# Patient Record
Sex: FEMALE | Race: WHITE | NOT HISPANIC OR LATINO | URBAN - METROPOLITAN AREA
[De-identification: names, ages, dates, MRNs, and addresses within clinical notes are randomized per-mention and may not be internally consistent; named-entity substitution may affect disease eponyms.]

---

## 2023-08-30 LAB — URINE CULTURE: NORMAL

## 2023-10-05 PROBLEM — L03.039 PARONYCHIA OF TOE: Status: ACTIVE | Noted: 2023-10-05

## 2023-10-05 PROBLEM — N94.6 DYSMENORRHEA: Status: ACTIVE | Noted: 2020-02-20

## 2023-10-05 RX ORDER — MUPIROCIN 20 MG/G
OINTMENT TOPICAL
COMMUNITY
Start: 2020-08-21 | End: 2023-10-06

## 2023-10-05 RX ORDER — SULFAMETHOXAZOLE AND TRIMETHOPRIM 800; 160 MG/1; MG/1
2 TABLET ORAL 2 TIMES DAILY
COMMUNITY
Start: 2020-08-21 | End: 2023-10-06

## 2023-10-05 RX ORDER — CHLORHEXIDINE GLUCONATE ORAL RINSE 1.2 MG/ML
SOLUTION DENTAL
COMMUNITY
End: 2023-10-06

## 2023-10-06 ENCOUNTER — OFFICE VISIT (OUTPATIENT)
Dept: OBSTETRICS AND GYNECOLOGY | Facility: CLINIC | Age: 23
End: 2023-10-06
Payer: COMMERCIAL

## 2023-10-06 VITALS
WEIGHT: 155.8 LBS | SYSTOLIC BLOOD PRESSURE: 132 MMHG | HEIGHT: 62 IN | BODY MASS INDEX: 28.67 KG/M2 | DIASTOLIC BLOOD PRESSURE: 76 MMHG

## 2023-10-06 DIAGNOSIS — Z12.4 CERVICAL CANCER SCREENING: ICD-10-CM

## 2023-10-06 DIAGNOSIS — Z30.41 ENCOUNTER FOR SURVEILLANCE OF CONTRACEPTIVE PILLS: ICD-10-CM

## 2023-10-06 DIAGNOSIS — Z01.419 WELL WOMAN EXAM WITH ROUTINE GYNECOLOGICAL EXAM: Primary | ICD-10-CM

## 2023-10-06 PROCEDURE — 88141 CYTOPATH C/V INTERPRET: CPT | Performed by: PATHOLOGY

## 2023-10-06 PROCEDURE — 87661 TRICHOMONAS VAGINALIS AMPLIF: CPT

## 2023-10-06 PROCEDURE — 99385 PREV VISIT NEW AGE 18-39: CPT

## 2023-10-06 PROCEDURE — 88175 CYTOPATH C/V AUTO FLUID REDO: CPT

## 2023-10-06 PROCEDURE — 87800 DETECT AGNT MULT DNA DIREC: CPT

## 2023-10-06 RX ORDER — NORGESTIMATE AND ETHINYL ESTRADIOL 7DAYSX3 LO
1 KIT ORAL DAILY
COMMUNITY
End: 2023-10-06 | Stop reason: SDUPTHER

## 2023-10-06 RX ORDER — NORGESTIMATE AND ETHINYL ESTRADIOL 7DAYSX3 LO
1 KIT ORAL DAILY
Qty: 28 TABLET | Refills: 11 | Status: SHIPPED | OUTPATIENT
Start: 2023-10-06 | End: 2023-10-20 | Stop reason: SDUPTHER

## 2023-10-06 ASSESSMENT — ENCOUNTER SYMPTOMS
DYSURIA: 0
RESPIRATORY NEGATIVE: 1
NEUROLOGICAL NEGATIVE: 1
PSYCHIATRIC NEGATIVE: 1
ALLERGIC/IMMUNOLOGIC NEGATIVE: 1
EYES NEGATIVE: 1
MUSCULOSKELETAL NEGATIVE: 1
ENDOCRINE NEGATIVE: 1
CARDIOVASCULAR NEGATIVE: 1
DIFFICULTY URINATING: 0
GASTROINTESTINAL NEGATIVE: 1
FREQUENCY: 0
HEMATOLOGIC/LYMPHATIC NEGATIVE: 1
CONSTITUTIONAL NEGATIVE: 1

## 2023-10-06 NOTE — PROGRESS NOTES
Assessment/Plan     22 y.o. presents for well woman exam.     Cervical Cancer Screening  - PAP today  - Reviewed ASCCP guidelines with pt; next PAP in 3 yrs if nml    STD Screening  - pt accepts GC/CT/Trich to be added to PAP    Contraceptive Plan  - Tri-Lo Indy OCP, pt happy with this method of contraception  - The risks of clotting with birth control containing estrogen was discussed with the patient. She denies a personal history of smoking, migraine with aura, blood clotting and hypertension. She denies having any relatives with a history of DVT or PE, and any relatives less than 50 years old with a history of MI or CVA.   - Patient aware of risks and consents to continuing this method. Refill Rx sent to pt preferred pharmacy.    Health Maintenance  - SBE reviewed and encouraged monthly  - diet and exercise reviewed; recommended 150min exercise per week or 30min/day x5 days  - Routine follow up with PCP for health maintenance examination encouraged    F/U in 1 year or as needed.    ZACHRAIAH Nieto-DEMETRIA Matson     Shelbie Harrell is a 22 y.o. female presenting for a well woman exam. No concerns today.     PMH, PSH, allergies, and current medications reviewed - see chart.  Family Hx reviewed. Pt reports no family hx of gynecologic or breast cancer in first degree family members.     OB Hx:      Social Hx:  - relationship: dating  - sexually active: yes  - employment:  at [employer]  - diet:  general  - exercise: yes  - denies tobacco or illicit drug use. reports occasional alcohol use.    Sexual Concerns: denies   PAP Hx: Last PAP N/A  Last mammo: N/A  STI Hx: denies  Contraception: Tri-Lo-Indy OCP  Menstrual Periods: Patient's last menstrual period was 2023 (exact date). Periods are regular every 28-30 days, lasting 4 days.  HPV Vaccination Status: vaccinated    Review of Systems   Constitutional: Negative.    HENT: Negative.     Eyes: Negative.    Respiratory: Negative.    "  Cardiovascular: Negative.    Gastrointestinal: Negative.    Endocrine: Negative.    Genitourinary: Negative.  Negative for difficulty urinating, dyspareunia, dysuria, frequency, genital sores, menstrual problem, pelvic pain, urgency, vaginal bleeding, vaginal discharge and vaginal pain.   Musculoskeletal: Negative.    Skin: Negative.    Allergic/Immunologic: Negative.    Neurological: Negative.    Hematological: Negative.    Psychiatric/Behavioral: Negative.         Objective     /76   Ht 1.575 m (5' 2\")   Wt 70.7 kg (155 lb 12.8 oz)   LMP 09/28/2023 (Exact Date)   BMI 28.50 kg/m²     Physical Exam  Vitals reviewed. Chaperone present: offered breast exam, pt declined.   Constitutional:       General: She is not in acute distress.     Appearance: Normal appearance.   HENT:      Head: Normocephalic and atraumatic.      Mouth/Throat:      Palate: No mass.   Neck:      Thyroid: No thyroid mass, thyromegaly or thyroid tenderness.   Cardiovascular:      Rate and Rhythm: Normal rate and regular rhythm.      Heart sounds: Normal heart sounds, S1 normal and S2 normal.   Pulmonary:      Effort: Pulmonary effort is normal.      Breath sounds: Normal breath sounds.   Abdominal:      General: Abdomen is flat.      Palpations: Abdomen is soft.      Tenderness: There is no abdominal tenderness.   Genitourinary:     General: Normal vulva.      Exam position: Lithotomy position.      Pubic Area: No rash.       Labia:         Right: No rash or lesion.         Left: No rash or lesion.       Urethra: No prolapse, urethral swelling or urethral lesion.      Vagina: Normal.      Cervix: No cervical motion tenderness, discharge or lesion.      Uterus: Not enlarged and not tender.       Adnexa:         Right: No mass or tenderness.          Left: No mass or tenderness.     Musculoskeletal:         General: Normal range of motion.      Cervical back: Normal range of motion and neck supple.   Skin:     General: Skin is warm and " dry.   Neurological:      Mental Status: She is alert and oriented to person, place, and time.   Psychiatric:         Mood and Affect: Mood normal.         Behavior: Behavior normal.         Thought Content: Thought content normal.         Judgment: Judgment normal.

## 2023-10-10 LAB
C TRACH RRNA SPEC QL NAA+PROBE: NEGATIVE
N GONORRHOEA DNA SPEC QL PROBE+SIG AMP: NEGATIVE
T VAGINALIS RRNA SPEC QL NAA+PROBE: NEGATIVE

## 2023-10-20 ENCOUNTER — TELEPHONE (OUTPATIENT)
Dept: OBSTETRICS AND GYNECOLOGY | Facility: CLINIC | Age: 23
End: 2023-10-20
Payer: COMMERCIAL

## 2023-10-20 DIAGNOSIS — Z30.41 ENCOUNTER FOR SURVEILLANCE OF CONTRACEPTIVE PILLS: ICD-10-CM

## 2023-10-20 RX ORDER — NORGESTIMATE AND ETHINYL ESTRADIOL 7DAYSX3 LO
1 KIT ORAL DAILY
Qty: 84 TABLET | Refills: 3 | Status: SHIPPED | OUTPATIENT
Start: 2023-10-20

## 2023-10-20 NOTE — TELEPHONE ENCOUNTER
Pt. Is asking for a 3 month supply of b/c instead of a one month supply. She is taking Tri-lo-noelle. Pharmacy is [facility] on [address].

## 2023-10-25 LAB
CYTOLOGY CMNT CVX/VAG CYTO-IMP: NORMAL
LAB AP HPV GENOTYPE QUESTION: YES
LAB AP HPV HR: NORMAL
LAB AP PAP ADDITIONAL TESTS: NORMAL
LABORATORY COMMENT REPORT: NORMAL
LMP START DATE: NORMAL
PATH REPORT.TOTAL CANCER: NORMAL

## 2024-01-26 ENCOUNTER — OFFICE VISIT (OUTPATIENT)
Dept: URGENT CARE | Facility: CLINIC | Age: 24
End: 2024-01-26
Payer: COMMERCIAL

## 2024-01-26 VITALS
HEART RATE: 115 BPM | RESPIRATION RATE: 18 BRPM | DIASTOLIC BLOOD PRESSURE: 84 MMHG | OXYGEN SATURATION: 99 % | SYSTOLIC BLOOD PRESSURE: 129 MMHG | TEMPERATURE: 99.3 F

## 2024-01-26 DIAGNOSIS — N12 PYELONEPHRITIS: Primary | ICD-10-CM

## 2024-01-26 DIAGNOSIS — R35.0 URINARY FREQUENCY: ICD-10-CM

## 2024-01-26 LAB
POC APPEARANCE, URINE: ABNORMAL
POC BILIRUBIN, URINE: ABNORMAL
POC BLOOD, URINE: ABNORMAL
POC COLOR, URINE: ABNORMAL
POC GLUCOSE, URINE: ABNORMAL MG/DL
POC KETONES, URINE: ABNORMAL MG/DL
POC LEUKOCYTES, URINE: ABNORMAL
POC NITRITE,URINE: POSITIVE
POC PH, URINE: 6 PH
POC PROTEIN, URINE: ABNORMAL MG/DL
POC SPECIFIC GRAVITY, URINE: 1.01
POC UROBILINOGEN, URINE: 2 EU/DL

## 2024-01-26 PROCEDURE — 96372 THER/PROPH/DIAG INJ SC/IM: CPT | Performed by: PHYSICIAN ASSISTANT

## 2024-01-26 PROCEDURE — 87086 URINE CULTURE/COLONY COUNT: CPT

## 2024-01-26 PROCEDURE — 99204 OFFICE O/P NEW MOD 45 MIN: CPT | Performed by: PHYSICIAN ASSISTANT

## 2024-01-26 PROCEDURE — 87800 DETECT AGNT MULT DNA DIREC: CPT

## 2024-01-26 PROCEDURE — 81002 URINALYSIS NONAUTO W/O SCOPE: CPT | Performed by: PHYSICIAN ASSISTANT

## 2024-01-26 PROCEDURE — 87661 TRICHOMONAS VAGINALIS AMPLIF: CPT

## 2024-01-26 PROCEDURE — 87186 SC STD MICRODIL/AGAR DIL: CPT

## 2024-01-26 RX ORDER — CEFPODOXIME PROXETIL 200 MG/1
200 TABLET, FILM COATED ORAL 2 TIMES DAILY
Qty: 20 TABLET | Refills: 0 | Status: SHIPPED | OUTPATIENT
Start: 2024-01-26 | End: 2024-02-05

## 2024-01-26 RX ORDER — CEFTRIAXONE 1 G/1
1000 INJECTION, POWDER, FOR SOLUTION INTRAMUSCULAR; INTRAVENOUS ONCE
Status: COMPLETED | OUTPATIENT
Start: 2024-01-26 | End: 2024-01-26

## 2024-01-26 RX ADMIN — CEFTRIAXONE 1000 MG: 1 INJECTION, POWDER, FOR SOLUTION INTRAMUSCULAR; INTRAVENOUS at 13:46

## 2024-01-26 ASSESSMENT — ENCOUNTER SYMPTOMS
RHINORRHEA: 0
BACK PAIN: 0
EYE DISCHARGE: 0
COLOR CHANGE: 0
BLOOD IN STOOL: 0
ACTIVITY CHANGE: 0
VOMITING: 1
FEVER: 1
ENDOCRINE NEGATIVE: 1
ARTHRALGIAS: 0
EYE REDNESS: 0
PSYCHIATRIC NEGATIVE: 1
SHORTNESS OF BREATH: 0
HEMATOLOGIC/LYMPHATIC NEGATIVE: 1
APPETITE CHANGE: 0
DYSURIA: 1
FLANK PAIN: 1
COUGH: 0
DIARRHEA: 0
SINUS PRESSURE: 0
NEUROLOGICAL NEGATIVE: 1
FATIGUE: 1
WOUND: 0
CHILLS: 1
ABDOMINAL PAIN: 1
SORE THROAT: 0
ALLERGIC/IMMUNOLOGIC NEGATIVE: 1
WHEEZING: 0
MYALGIAS: 1
EYE PAIN: 0
NAUSEA: 1

## 2024-01-26 NOTE — PROGRESS NOTES
Subjective   Patient ID: Shelbie Harrell is a 23 y.o. female who presents for Chills and Urinary Frequency.  Patient notes dysuria and frequency ongoing now for 3 days.  She did try taking Azo earlier today though notes that this led to vomiting.  She denies any vaginal discharge or vaginal complaints.  She notes that she is sexually active.  She notes that she is also with back pain.  She endorses sensation fever.  She denies a history of significant urinary tract infections.      Review of Systems   Constitutional:  Positive for chills, fatigue and fever. Negative for activity change and appetite change.   HENT:  Negative for congestion, rhinorrhea, sinus pressure and sore throat.    Eyes:  Negative for pain, discharge and redness.   Respiratory:  Negative for cough, shortness of breath and wheezing.    Cardiovascular:  Negative for chest pain and leg swelling.   Gastrointestinal:  Positive for abdominal pain, nausea and vomiting. Negative for blood in stool and diarrhea.   Endocrine: Negative.    Genitourinary:  Positive for dysuria and flank pain.   Musculoskeletal:  Positive for myalgias. Negative for arthralgias, back pain and gait problem.   Skin:  Negative for color change, rash and wound.   Allergic/Immunologic: Negative.    Neurological: Negative.    Hematological: Negative.    Psychiatric/Behavioral: Negative.         Objective   /84   Pulse (!) 115   Temp 37.4 °C (99.3 °F)   Resp 18   SpO2 99%   Physical Exam  Constitutional:       General: She is not in acute distress.     Appearance: Normal appearance. She is not toxic-appearing or diaphoretic.   HENT:      Head: Normocephalic and atraumatic.      Mouth/Throat:      Mouth: Mucous membranes are moist.      Pharynx: Oropharynx is clear.   Eyes:      Conjunctiva/sclera: Conjunctivae normal.   Cardiovascular:      Rate and Rhythm: Regular rhythm. Tachycardia present.      Heart sounds: No murmur heard.  Pulmonary:      Effort: Pulmonary effort is  normal. No respiratory distress.      Breath sounds: Normal breath sounds. No wheezing.   Abdominal:      Tenderness: There is abdominal tenderness (Suprapubic tenderness). There is left CVA tenderness. There is no right CVA tenderness, guarding or rebound.   Musculoskeletal:         General: No swelling, tenderness, deformity or signs of injury. Normal range of motion.      Cervical back: Normal range of motion and neck supple.   Skin:     General: Skin is warm and dry.      Findings: No erythema or rash.   Neurological:      General: No focal deficit present.      Mental Status: She is alert and oriented to person, place, and time.      Gait: Gait normal.         Assessment/Plan   Problem List Items Addressed This Visit       Urinary frequency    Relevant Orders    POCT UA (nonautomated w/o microscopy) manually resulted (Completed)    C. trachomatis + N. gonorrhoeae, Amplified    Trichomonas vaginalis, Nucleic Acid Detection    Pyelonephritis - Primary    Relevant Medications    cefTRIAXone (Rocephin) vial 1,000 mg    cefpodoxime (Vantin) 200 mg tablet    Other Relevant Orders    Urine culture    -Highest clinical suspicion of pyelonephritis affecting the left sided kidney.  Patient is afebrile at time of exam but is noted to be with a mildly elevated temp at 99 3.  Heart rate of 115 she is nontoxic in appearance at this time and I do feel that the patient is safe for outpatient treatment.  Patient receives 1 g of Rocephin here in office to initiate prompt treatment, cefpodoxime sent to the patient's pharmacy to be taken twice daily for 10 days.  I discussed with patient that if there is any worsening of her symptoms she should immediately go to the emergency room for further evaluation and management.  Finish the entirety of the course of antibiotics if your symptoms are improving even if you are feeling 100% better finish the entire course

## 2024-01-26 NOTE — LETTER
January 26, 2024     Patient: Shelbie Harrell   YOB: 2000   Date of Visit: 1/26/2024       To Whom It May Concern:    It is my medical opinion that Shelbie Harrell may return to work on 01/29/2024 .    If you have any questions or concerns, please don't hesitate to call.         Sincerely,        Shilo Hampton PA-C    CC: No Recipients

## 2024-01-26 NOTE — PATIENT INSTRUCTIONS
Assessment/Plan   Problem List Items Addressed This Visit       Urinary frequency    Relevant Orders    POCT UA (nonautomated w/o microscopy) manually resulted (Completed)    C. trachomatis + N. gonorrhoeae, Amplified    Trichomonas vaginalis, Nucleic Acid Detection    Pyelonephritis - Primary    Relevant Medications    cefTRIAXone (Rocephin) vial 1,000 mg    cefpodoxime (Vantin) 200 mg tablet    Other Relevant Orders    Urine culture    -Highest clinical suspicion of pyelonephritis affecting the left sided kidney.  Patient is afebrile at time of exam but is noted to be with a mildly elevated temp at 99 3.  Heart rate of 115 she is nontoxic in appearance at this time and I do feel that the patient is safe for outpatient treatment.  Patient receives 1 g of Rocephin here in office to initiate prompt treatment, cefpodoxime sent to the patient's pharmacy to be taken twice daily for 10 days.  I discussed with patient that if there is any worsening of her symptoms she should immediately go to the emergency room for further evaluation and management.  Finish the entirety of the course of antibiotics if your symptoms are improving even if you are feeling 100% better finish the entire course

## 2024-01-27 LAB
C TRACH RRNA SPEC QL NAA+PROBE: NEGATIVE
N GONORRHOEA DNA SPEC QL PROBE+SIG AMP: NEGATIVE

## 2024-01-28 LAB — T VAGINALIS RRNA SPEC QL NAA+PROBE: NEGATIVE

## 2024-01-29 LAB — BACTERIA UR CULT: ABNORMAL

## 2024-12-27 ENCOUNTER — PATIENT MESSAGE (OUTPATIENT)
Dept: OBSTETRICS AND GYNECOLOGY | Facility: CLINIC | Age: 24
End: 2024-12-27
Payer: COMMERCIAL

## 2024-12-27 ENCOUNTER — TELEPHONE (OUTPATIENT)
Dept: OBSTETRICS AND GYNECOLOGY | Facility: CLINIC | Age: 24
End: 2024-12-27
Payer: COMMERCIAL

## 2024-12-27 DIAGNOSIS — Z30.41 ENCOUNTER FOR SURVEILLANCE OF CONTRACEPTIVE PILLS: ICD-10-CM

## 2024-12-27 NOTE — TELEPHONE ENCOUNTER
12/30/24 SNP   Called pharm and script went through no problems no PA needed.     Pt originally requested refill on ocps via [employer] message.  Last seen10/2023.  Pt aware will need to  make appt for annual exam.  Short term supply ordered in system and sent to [facility] for approval.

## 2024-12-28 RX ORDER — NORGESTIMATE AND ETHINYL ESTRADIOL 7DAYSX3 LO
1 KIT ORAL DAILY
Qty: 28 TABLET | Refills: 0 | Status: SHIPPED | OUTPATIENT
Start: 2024-12-28

## 2025-01-14 ENCOUNTER — APPOINTMENT (OUTPATIENT)
Dept: OBSTETRICS AND GYNECOLOGY | Facility: CLINIC | Age: 25
End: 2025-01-14
Payer: COMMERCIAL

## 2025-01-14 VITALS
SYSTOLIC BLOOD PRESSURE: 122 MMHG | HEIGHT: 63 IN | WEIGHT: 159 LBS | BODY MASS INDEX: 28.17 KG/M2 | DIASTOLIC BLOOD PRESSURE: 84 MMHG

## 2025-01-14 DIAGNOSIS — Z30.41 ENCOUNTER FOR SURVEILLANCE OF CONTRACEPTIVE PILLS: ICD-10-CM

## 2025-01-14 DIAGNOSIS — Z00.00 WELL WOMAN EXAM WITHOUT GYNECOLOGICAL EXAM: Primary | ICD-10-CM

## 2025-01-14 PROCEDURE — 1036F TOBACCO NON-USER: CPT

## 2025-01-14 PROCEDURE — 99395 PREV VISIT EST AGE 18-39: CPT

## 2025-01-14 PROCEDURE — 3008F BODY MASS INDEX DOCD: CPT

## 2025-01-14 RX ORDER — NORGESTIMATE AND ETHINYL ESTRADIOL 7DAYSX3 LO
1 KIT ORAL DAILY
Qty: 84 TABLET | Refills: 3 | Status: SHIPPED | OUTPATIENT
Start: 2025-01-14 | End: 2025-01-14

## 2025-01-14 RX ORDER — NORGESTIMATE AND ETHINYL ESTRADIOL 7DAYSX3 LO
1 KIT ORAL DAILY
Qty: 84 TABLET | Refills: 3 | Status: SHIPPED | OUTPATIENT
Start: 2025-01-14 | End: 2026-01-14

## 2025-01-14 ASSESSMENT — PAIN SCALES - GENERAL: PAINLEVEL_OUTOF10: 0-NO PAIN

## 2025-01-14 NOTE — PROGRESS NOTES
"Assessment/Plan     24 y.o. presents for well woman exam.     Cervical Cancer Screening  - PAP up to date  - Reviewed ASCCP guidelines with pt; next PAP due 10/2026    STD Screening  - Declines    Contraceptive Plan  - Pt taking OCP; happy with this method of contraception  - The risks of clotting with birth control containing estrogen were discussed with the patient. She denies a personal history of smoking, migraine with aura, blood clotting and hypertension. She denies having any relatives with a history of DVT or PE, and any relatives less than 50 years old with a history of MI or CVA.   - Patient aware of risks and consents to continuing this method. Refill Rx sent to pt preferred pharmacy.    Health Maintenance  - SBE reviewed and encouraged monthly  - diet and exercise reviewed; recommended 150min exercise per week or 30min/day x5 days  - Routine follow up with PCP for health maintenance examination encouraged    F/U in 1 year or as needed.    IRINEO Nieto     Dano Harrell is a 24 y.o. established female patient presenting for a well woman exam. No concerns today.   She denies changes to medical, surgical, or family history since last exam.   OB Hx:       Social Hx:  - relationship: dating   - sexually active: yes  - employment:  at [employer]   - diet:  general  - exercise: yes  - denies tobacco or illicit drug use. reports occasional alcohol use.     Sexual Concerns: denies   PAP Hx: Last PAP 10/6/23 nml  STI Hx: denies  Contraception: Tri-Lo-Indy OCP, Pt happy with this method of contraception   Menstrual Periods: Patient's last menstrual period was 2024. Periods are regular every 28-30 days, lasting  4-5  days.  HPV Vaccination Status: vaccinated    Objective     /84   Ht 1.6 m (5' 3\")   Wt 72.1 kg (159 lb)   LMP 2024   BMI 28.17 kg/m²     Physical Exam  Vitals reviewed.   Constitutional:       General: She is not in acute distress.     " Appearance: Normal appearance.   HENT:      Head: Normocephalic and atraumatic.      Mouth/Throat:      Palate: No mass.   Neck:      Thyroid: No thyroid mass, thyromegaly or thyroid tenderness.   Cardiovascular:      Rate and Rhythm: Normal rate and regular rhythm.      Heart sounds: Normal heart sounds, S1 normal and S2 normal.   Pulmonary:      Effort: Pulmonary effort is normal.      Breath sounds: Normal breath sounds.   Chest:   Breasts:     Right: Normal. No mass, nipple discharge, skin change or tenderness.      Left: Normal. No mass, nipple discharge, skin change or tenderness.   Abdominal:      General: Abdomen is flat.      Palpations: Abdomen is soft.      Tenderness: There is no abdominal tenderness.   Genitourinary:     Comments: Exam offered, pt declined  Musculoskeletal:         General: Normal range of motion.      Cervical back: Normal range of motion and neck supple.   Skin:     General: Skin is warm and dry.   Neurological:      Mental Status: She is alert and oriented to person, place, and time.   Psychiatric:         Mood and Affect: Mood normal.         Behavior: Behavior normal.         Thought Content: Thought content normal.         Judgment: Judgment normal.

## 2025-01-24 DIAGNOSIS — Z30.41 ENCOUNTER FOR SURVEILLANCE OF CONTRACEPTIVE PILLS: ICD-10-CM

## 2025-01-24 RX ORDER — NORGESTIMATE AND ETHINYL ESTRADIOL 7DAYSX3 LO
1 KIT ORAL DAILY
Qty: 84 TABLET | Refills: 3 | OUTPATIENT
Start: 2025-01-24

## 2025-01-27 ENCOUNTER — TELEPHONE (OUTPATIENT)
Dept: OBSTETRICS AND GYNECOLOGY | Facility: CLINIC | Age: 25
End: 2025-01-27
Payer: COMMERCIAL

## 2025-01-27 NOTE — TELEPHONE ENCOUNTER
PA initiated for     norgestimate-ethinyl estradioL (Ortho Tri-Cyclen LO) 0.18/0.215/0.25 mg-25 mcg tablet     1/27/25 4516 Approved  Prior Authorization Portal   Prior authorization approved  Payer: [employer] Patient's Payer Case ID: [id number]    [phone]  Note from payer: Your PA request has been approved. Additional information will be provided in the approval communication. (Message 1147)  Approval Details    Authorized from January 27, 2025 to January 27, 2028